# Patient Record
Sex: MALE | Race: WHITE | Employment: OTHER | URBAN - METROPOLITAN AREA
[De-identification: names, ages, dates, MRNs, and addresses within clinical notes are randomized per-mention and may not be internally consistent; named-entity substitution may affect disease eponyms.]

---

## 2017-04-17 ENCOUNTER — OFFICE VISIT (OUTPATIENT)
Dept: FAMILY MEDICINE CLINIC | Facility: CLINIC | Age: 62
End: 2017-04-17

## 2017-04-17 VITALS
SYSTOLIC BLOOD PRESSURE: 134 MMHG | TEMPERATURE: 98 F | WEIGHT: 214 LBS | BODY MASS INDEX: 28.99 KG/M2 | RESPIRATION RATE: 16 BRPM | HEIGHT: 72 IN | HEART RATE: 82 BPM | DIASTOLIC BLOOD PRESSURE: 78 MMHG

## 2017-04-17 DIAGNOSIS — H66.92 ACUTE LEFT OTITIS MEDIA: Primary | ICD-10-CM

## 2017-04-17 PROCEDURE — 99213 OFFICE O/P EST LOW 20 MIN: CPT | Performed by: NURSE PRACTITIONER

## 2017-04-17 RX ORDER — AMOXICILLIN 875 MG/1
875 TABLET, COATED ORAL 2 TIMES DAILY
Qty: 20 TABLET | Refills: 0 | Status: SHIPPED | OUTPATIENT
Start: 2017-04-17 | End: 2017-04-27

## 2017-04-17 NOTE — PROGRESS NOTES
CHIEF COMPLAINT:   Patient presents with:  Nasal Congestion: started saturday 4/15 w/ sinus pressure. Ear Pain: L ear started last night with mild hearing loss      HPI:   Shelbi Arenas is a 63year old male who presents for cold symptoms for  2  days. INJECTION, W/WO CONTRAST, DX/THERAPEUTIC SUBSTANCE, EPIDURAL/SUBARACHNOID; LUMBAR/SACRAL N/A 7/8/2014    Comment Procedure: LUMBAR EPIDURAL;  Surgeon: Anneliese Beckwith MD;  Location: [address] [place] [address] NDL/CATH SPI DX/THER Comment Procedure: INTRAOPERATIVE NEURO MONITORING;  Surgeon: Jeison Rivera MD;  Location: [facility] MAIN OR      Family History   Problem Relation Age of Onset   • Heart Disorder Father         Smoking Status: Current Some Day Smoker         Packs/Day: 0.00 Signed Prescriptions Disp Refills    amoxicillin 875 MG Oral Tab 20 tablet 0      Sig: Take 1 tablet (875 mg total) by mouth 2 (two) times daily. Risks, benefits, side effects of medication addressed and explained.     Patient Instructions     Mid © 6403-3031 [address], [address] [place]. All rights reserved. This information is not intended as a substitute for professional medical care. Always follow your healthcare professional's instructions.               [place]

## 2017-05-30 ENCOUNTER — HOSPITAL ENCOUNTER (EMERGENCY)
Age: 62
Discharge: HOME | End: 2017-05-30
Attending: EMERGENCY MEDICINE
Payer: COMMERCIAL

## 2017-05-30 ENCOUNTER — OFFICE VISIT (OUTPATIENT)
Dept: FAMILY MEDICINE CLINIC | Facility: CLINIC | Age: 62
End: 2017-05-30

## 2017-05-30 VITALS
BODY MASS INDEX: 28 KG/M2 | TEMPERATURE: 99 F | WEIGHT: 210 LBS | SYSTOLIC BLOOD PRESSURE: 184 MMHG | OXYGEN SATURATION: 96 % | RESPIRATION RATE: 16 BRPM | HEART RATE: 89 BPM | DIASTOLIC BLOOD PRESSURE: 89 MMHG

## 2017-05-30 VITALS
DIASTOLIC BLOOD PRESSURE: 86 MMHG | HEIGHT: 72 IN | SYSTOLIC BLOOD PRESSURE: 154 MMHG | BODY MASS INDEX: 29.39 KG/M2 | HEART RATE: 131 BPM | WEIGHT: 217 LBS | RESPIRATION RATE: 16 BRPM | OXYGEN SATURATION: 98 % | TEMPERATURE: 98 F

## 2017-05-30 DIAGNOSIS — Z02.9 ENCOUNTER FOR ADMINISTRATIVE EXAMINATIONS: ICD-10-CM

## 2017-05-30 DIAGNOSIS — K12.2 UVULITIS: Primary | ICD-10-CM

## 2017-05-30 DIAGNOSIS — H66.92 LEFT OTITIS MEDIA, UNSPECIFIED CHRONICITY, UNSPECIFIED OTITIS MEDIA TYPE: ICD-10-CM

## 2017-05-30 DIAGNOSIS — K12.2 UVULITIS: ICD-10-CM

## 2017-05-30 DIAGNOSIS — J02.9 SORE THROAT: Primary | ICD-10-CM

## 2017-05-30 PROCEDURE — 87430 STREP A AG IA: CPT | Performed by: EMERGENCY MEDICINE

## 2017-05-30 PROCEDURE — 87880 STREP A ASSAY W/OPTIC: CPT | Performed by: NURSE PRACTITIONER

## 2017-05-30 PROCEDURE — 99283 EMERGENCY DEPT VISIT LOW MDM: CPT

## 2017-05-30 PROCEDURE — 87081 CULTURE SCREEN ONLY: CPT | Performed by: EMERGENCY MEDICINE

## 2017-05-30 RX ORDER — PREDNISONE 20 MG/1
40 TABLET ORAL ONCE
Status: COMPLETED | OUTPATIENT
Start: 2017-05-30 | End: 2017-05-30

## 2017-05-30 RX ORDER — PREDNISONE 20 MG/1
40 TABLET ORAL DAILY
Qty: 8 TABLET | Refills: 0 | Status: SHIPPED | OUTPATIENT
Start: 2017-05-30 | End: 2017-06-03

## 2017-05-30 RX ORDER — AMOXICILLIN 875 MG/1
875 TABLET, COATED ORAL 2 TIMES DAILY
Qty: 20 TABLET | Refills: 0 | Status: SHIPPED | OUTPATIENT
Start: 2017-05-30 | End: 2017-06-09

## 2017-05-30 RX ORDER — DIPHENHYDRAMINE HCL 50 MG
50 CAPSULE ORAL ONCE
Status: COMPLETED | OUTPATIENT
Start: 2017-05-30 | End: 2017-05-30

## 2017-05-31 NOTE — ED PROVIDER NOTES
Patient Seen in: THE [facility] Emergency Department In [place]    History   Patient presents with:  Sore Throat    Stated Complaint: swollen uvula    HPI    82-year-old male complaining of swollen uvula the patient states this started this afternoon more so Akira Hagen MD;  Location: [address] [place] [address]    INJECTION, W/WO CONTRAST, DX/THERAPEUTIC SUBSTANCE, EPIDURAL/SUBARACHNOID; LUMBAR/SACRAL N/A 8/7/2014    Comment Procedure: LUMBAR EPIDURAL;  Surgeon: Vaishali Batres MD;  Location: [address] [place] [address] 2 cigars/day    Alcohol Use: Yes                Comment: 6-7 beers/weekend      Review of Systems    Positive for stated complaint: swollen uvula  Other systems are as noted in HPI. Constitutional and vital signs reviewed.       All other systems reviewed chronicity, unspecified otitis media type    Disposition:  Discharge    Follow-up:  KANDY Hutchins [address] [id number] [phone]    In 3 days        Medications Prescribed:  Current Discharge Medication List    STA

## 2017-05-31 NOTE — ED INITIAL ASSESSMENT (HPI)
C/o sore throat, seen at walk-in clinic. Sent to ER d/t swollen uvula.  States pain started suddenly today

## 2017-05-31 NOTE — PROGRESS NOTES
HPI:   Juan Garay is a 63year old male who presents with ill symptoms for  1  days. Patient reports swollen uvula, mild sore throat. Has tried Tylenol with not much relief.        Current Outpatient Prescriptions:  celecoxib (CELEBREX) 200 MG Oral Cap FOR PAIN MANAGEMENT    EM OW[facility]U & Malia Horta NDL/CATH SPI DX/THER NJX N/A 7/24/2014    Comment Procedure: LUMBAR EPIDURAL;  Surgeon: Priti Lazar MD;  Location: [address] [place] [address] BY [facility] [address] [facility] [address]  N [facility] 5+ YR N/A 7/24/2014    Comment 98%  GENERAL: well developed, well nourished,in no apparent distress, mildly muffled voice and non toxic appearing.   HEENT: atraumatic, normocephalic,ears with right TM cloudy and normal appearing, left TM clear, throat with positive swollen uvula, difficu

## 2017-05-31 NOTE — ED NOTES
Pt able to swallow w/o difficulty, understands no driving or alcohol while taking Benadryl d/t drowsiness.

## 2017-08-31 RX ORDER — SODIUM CHLORIDE, SODIUM LACTATE, POTASSIUM CHLORIDE, CALCIUM CHLORIDE 600; 310; 30; 20 MG/100ML; MG/100ML; MG/100ML; MG/100ML
INJECTION, SOLUTION INTRAVENOUS CONTINUOUS
Status: CANCELLED | OUTPATIENT
Start: 2017-08-31

## 2017-09-02 ENCOUNTER — APPOINTMENT (OUTPATIENT)
Dept: LAB | Age: 62
End: 2017-09-02
Payer: COMMERCIAL

## 2017-09-02 DIAGNOSIS — Z12.11 SCREENING FOR MALIGNANT NEOPLASM OF COLON: ICD-10-CM

## 2017-09-02 PROCEDURE — 93010 ELECTROCARDIOGRAM REPORT: CPT | Performed by: INTERNAL MEDICINE

## 2017-09-02 PROCEDURE — 93005 ELECTROCARDIOGRAM TRACING: CPT

## 2017-09-05 ENCOUNTER — HOSPITAL ENCOUNTER (OUTPATIENT)
Facility: HOSPITAL | Age: 62
Setting detail: HOSPITAL OUTPATIENT SURGERY
Discharge: HOME | End: 2017-09-05
Attending: INTERNAL MEDICINE | Admitting: INTERNAL MEDICINE
Payer: COMMERCIAL

## 2017-09-05 ENCOUNTER — ANESTHESIA EVENT (OUTPATIENT)
Dept: ENDOSCOPY | Facility: HOSPITAL | Age: 62
End: 2017-09-05
Payer: COMMERCIAL

## 2017-09-05 ENCOUNTER — SURGERY (OUTPATIENT)
Age: 62
End: 2017-09-05

## 2017-09-05 ENCOUNTER — ANESTHESIA (OUTPATIENT)
Dept: ENDOSCOPY | Facility: HOSPITAL | Age: 62
End: 2017-09-05
Payer: COMMERCIAL

## 2017-09-05 VITALS
OXYGEN SATURATION: 95 % | BODY MASS INDEX: 28.17 KG/M2 | TEMPERATURE: 98 F | SYSTOLIC BLOOD PRESSURE: 130 MMHG | HEIGHT: 72 IN | WEIGHT: 208 LBS | RESPIRATION RATE: 16 BRPM | HEART RATE: 86 BPM | DIASTOLIC BLOOD PRESSURE: 85 MMHG

## 2017-09-05 DIAGNOSIS — Z12.11 SCREENING FOR MALIGNANT NEOPLASM OF COLON: Primary | ICD-10-CM

## 2017-09-05 LAB
ATRIAL RATE: 76 BPM
P AXIS: -17 DEGREES
P-R INTERVAL: 132 MS
Q-T INTERVAL: 392 MS
QRS DURATION: 104 MS
QTC CALCULATION (BEZET): 441 MS
R AXIS: 64 DEGREES
T AXIS: 44 DEGREES
VENTRICULAR RATE: 76 BPM

## 2017-09-05 PROCEDURE — 0DBK8ZX EXCISION OF ASCENDING COLON, VIA NATURAL OR ARTIFICIAL OPENING ENDOSCOPIC, DIAGNOSTIC: ICD-10-PCS | Performed by: INTERNAL MEDICINE

## 2017-09-05 PROCEDURE — 88305 TISSUE EXAM BY PATHOLOGIST: CPT | Performed by: INTERNAL MEDICINE

## 2017-09-05 PROCEDURE — 0DBH8ZX EXCISION OF CECUM, VIA NATURAL OR ARTIFICIAL OPENING ENDOSCOPIC, DIAGNOSTIC: ICD-10-PCS | Performed by: INTERNAL MEDICINE

## 2017-09-05 RX ORDER — SODIUM CHLORIDE, SODIUM LACTATE, POTASSIUM CHLORIDE, CALCIUM CHLORIDE 600; 310; 30; 20 MG/100ML; MG/100ML; MG/100ML; MG/100ML
INJECTION, SOLUTION INTRAVENOUS CONTINUOUS
Status: DISCONTINUED | OUTPATIENT
Start: 2017-09-05 | End: 2017-09-05

## 2017-09-05 RX ORDER — AMLODIPINE BESYLATE 2.5 MG/1
2.5 TABLET ORAL DAILY
COMMUNITY
End: 2020-02-01 | Stop reason: DRUGHIGH

## 2017-09-05 NOTE — ANESTHESIA POSTPROCEDURE EVALUATION
[address] [facility] Patient Status:  Hospital Outpatient Surgery   Age/Gender 63year old male MRN [id number]   Location [address] [place] [address]. Attending Ирина Mariee MD   Hosp Day # 0 PCP Yolanda Monge MD       Anesthesia Post-
Wears glasses for reading/Partially impaired: cannot see medication labels or newsprint, but can see obstacles in path, and the surrounding layout; can count fingers at arm's length

## 2017-09-05 NOTE — ANESTHESIA PREPROCEDURE EVALUATION
PRE-OP EVALUATION    Patient Name: Deon Gottron    Pre-op Diagnosis: SCREENING FOR MALIGNANT NEOPLASM OF COLON    Procedure(s):  COLONOSCOPY    Surgeon(s) and Role:     Jessi Day MD - Primary    Pre-op vitals reviewed.   Temp: 98.3 °F (36.8 °C DX/THER NGL N/A      Comment: Procedure: LUMBAR EPIDURAL;  Surgeon:                John Jones MD;  Location:  [address] MANAGEMENT  8/7/2014: Deangelo OWUSU & Bella Samuels NDL/CATH SPI DX/THER IOM N/A      Comment: Procedure: LUMBAR EPIDU regular  Rate: normal     Dental    No notable dental history. Pulmonary    Pulmonary exam normal.  Breath sounds clear to auscultation bilaterally.                Other findings            ASA: 2   Plan: MAC  NPO status verified and patient meets g

## 2017-09-05 NOTE — H&P
[facility] [address] Patient Status:  Hospital Outpatient Surgery    1955 MRN [id number]   [facility] ENDOSCOPY Attending Peyton Rivera MD   Hosp Day # 0 PCP Adriane Valero MD     CC: Screening Micaela Chamorro MD;  Location: [place] [address] MANAGEMENT  7/24/2014: M[facility]SED[facility] BY  PHYS [address] [facility]  [address] [facility] 5+ YR N/A      Comment: Procedure: LUMBAR EPIDURAL;  Surgeon:                Micaela Chamorro MD;  Location: [address] [place] [address]  encounter      Screening    Plan  Colonoscopy    Dileep Ramirez  9/5/2017  12:50 PM

## 2017-09-05 NOTE — OPERATIVE REPORT
Juan Fernández Patient Status:  Hospital Outpatient Surgery    1955 MRN [id number]   [facility] ENDOSCOPY Attending Mildred Masters MD   Hosp Day # 0 PCP Michael Man MD     PREOPERATIVE DIAGNOSIS/INDICATION: Screening  POS watch for bleeding, infection, perforation, adverse drug reactions   - Follow biopsies.  - Repeat colonoscopy in 5 years.     Ramo Lam  9/5/2017  1:39 PM

## 2020-02-01 ENCOUNTER — OFFICE VISIT (OUTPATIENT)
Dept: PODIATRY CLINIC | Facility: CLINIC | Age: 65
End: 2020-02-01
Payer: COMMERCIAL

## 2020-02-01 DIAGNOSIS — L84 CALLUS OF FOOT: ICD-10-CM

## 2020-02-01 DIAGNOSIS — M79.671 FOOT PAIN, RIGHT: Primary | ICD-10-CM

## 2020-02-01 DIAGNOSIS — M21.371 ACQUIRED RIGHT FOOT DROP: ICD-10-CM

## 2020-02-01 PROCEDURE — 99203 OFFICE O/P NEW LOW 30 MIN: CPT | Performed by: PODIATRIST

## 2020-02-01 RX ORDER — AMLODIPINE BESYLATE 10 MG/1
TABLET ORAL
COMMUNITY
Start: 2020-01-18

## 2020-02-03 NOTE — PROGRESS NOTES
Saulo Blankenship is a 60year old male. Patient presents with:  Consult: right foot, callus outside edge of foot.  \"by the end of the day, it's killing me\" pt does wear brace, and would like to discuss a  prescription for a new brace        HPI:   Patient 8/7/2014    Performed by Vaishali Batres MD at [address] [place] [address] N/A 7/24/2014    Performed by Vaishali Batres MD at [address] [place] [address] N/A 7/8/2014    Performed by Vaishali Batres MD at [facility] out of 5 and eversion is about 2+ out of 5. This is on the right lower extremity.     ASSESSMENT AND PLAN:   Diagnoses and all orders for this visit:    Foot pain, right  -     DME - EXTERNAL     Acquired right foot drop  -     DME - EXTERNAL     Callus of

## 2021-11-16 ENCOUNTER — APPOINTMENT (OUTPATIENT)
Dept: GENERAL RADIOLOGY | Age: 66
End: 2021-11-16
Payer: COMMERCIAL

## 2021-11-16 ENCOUNTER — HOSPITAL ENCOUNTER (EMERGENCY)
Age: 66
Discharge: HOME | End: 2021-11-16
Payer: COMMERCIAL

## 2021-11-16 VITALS
RESPIRATION RATE: 22 BRPM | WEIGHT: 230 LBS | DIASTOLIC BLOOD PRESSURE: 80 MMHG | HEIGHT: 72 IN | TEMPERATURE: 98 F | HEART RATE: 106 BPM | SYSTOLIC BLOOD PRESSURE: 144 MMHG | BODY MASS INDEX: 31.15 KG/M2 | OXYGEN SATURATION: 96 %

## 2021-11-16 DIAGNOSIS — S60.222A CONTUSION OF LEFT HAND, INITIAL ENCOUNTER: ICD-10-CM

## 2021-11-16 DIAGNOSIS — S61.215A LACERATION OF LEFT RING FINGER WITHOUT FOREIGN BODY WITHOUT DAMAGE TO NAIL, INITIAL ENCOUNTER: ICD-10-CM

## 2021-11-16 DIAGNOSIS — S50.12XA CONTUSION OF LEFT FOREARM, INITIAL ENCOUNTER: Primary | ICD-10-CM

## 2021-11-16 PROCEDURE — 12001 RPR S/N/AX/GEN/TRNK 2.5CM/<: CPT

## 2021-11-16 PROCEDURE — 99284 EMERGENCY DEPT VISIT MOD MDM: CPT

## 2021-11-16 PROCEDURE — 73130 X-RAY EXAM OF HAND: CPT

## 2021-11-16 PROCEDURE — 73090 X-RAY EXAM OF FOREARM: CPT

## 2021-11-16 RX ORDER — HYDROCODONE BITARTRATE AND ACETAMINOPHEN 5; 325 MG/1; MG/1
1-2 TABLET ORAL EVERY 6 HOURS PRN
Qty: 10 TABLET | Refills: 0 | Status: SHIPPED | OUTPATIENT
Start: 2021-11-16 | End: 2021-11-23

## 2021-11-16 RX ORDER — HYDROCODONE BITARTRATE AND ACETAMINOPHEN 5; 325 MG/1; MG/1
1 TABLET ORAL ONCE
Status: COMPLETED | OUTPATIENT
Start: 2021-11-16 | End: 2021-11-16

## 2021-11-17 NOTE — ED PROVIDER NOTES
Patient Seen in: THE [facility] Emergency Department In [place]      History   Patient presents with:  Laceration/Abrasion    Stated Complaint: Laceration to right hand, patient was putting up [place] decorations.      Subjective:   HPI    Patient is a 66-ye CONTRAST, DX/THERAPEUTIC SUBSTANCE, EPIDURAL/SUBARACHNOID; LUMBAR/SACRAL N/A 7/24/2014    Procedure: LUMBAR EPIDURAL;  Surgeon: Seun Burnham MD;  Location: [facility] FOR PAIN MANAGEMENT   • INJECTION, W/WO CONTRAST, DX/THERAPEUTIC SUBSTANCE, EPIDURAL/S round reactive to light and accommodation. Mouth normal, neck supple, no meningismus. Neck nontender, head atraumatic  LUNGS: Lungs clear to auscultation bilaterally. CARDIOVASCULAR: + S1-S2, regular rate and rhythm, no murmurs.   BACK: No CVA tenderness encounter     Disposition:  Discharge  11/16/2021  9:50 pm    Follow-up:  KANDY Pressley Rene [address] [id number] [phone]    In 2 days            Medications Prescribed:  Current Discharge Medication List    START taking t

## 2021-11-17 NOTE — ED INITIAL ASSESSMENT (HPI)
Pt to ed from a fall off his ladder,  (8ft up) pain and injury hand and forearm bump, denies hitting head or loc, pt on blood thinner

## 2024-06-24 ENCOUNTER — HOSPITAL ENCOUNTER (OUTPATIENT)
Dept: CV DIAGNOSTICS | Facility: HOSPITAL | Age: 69
Discharge: HOME | End: 2024-06-24
Attending: FAMILY MEDICINE

## 2024-06-24 DIAGNOSIS — E78.5 HYPERLIPIDEMIA, UNSPECIFIED HYPERLIPIDEMIA TYPE: ICD-10-CM

## 2024-06-24 DIAGNOSIS — I10 HYPERTENSION, ESSENTIAL: ICD-10-CM

## 2024-06-24 PROCEDURE — 93306 TTE W/DOPPLER COMPLETE: CPT | Performed by: FAMILY MEDICINE

## 2024-09-06 PROBLEM — Z86.0101 HISTORY OF ADENOMATOUS POLYP OF COLON: Status: ACTIVE | Noted: 2024-09-06

## 2025-01-06 ENCOUNTER — TELEPHONE (OUTPATIENT)
Facility: CLINIC | Age: 70
End: 2025-01-06

## 2025-01-06 DIAGNOSIS — M79.671 RIGHT FOOT PAIN: Primary | ICD-10-CM

## 2025-01-06 NOTE — TELEPHONE ENCOUNTER
Left message with patient to ask how long ago the right foot and ankle Xrays were done and if they were weight bearing.

## 2025-01-06 NOTE — TELEPHONE ENCOUNTER
New pt appt for rt foot and ankle imaging on disc and pt will bring  Future Appointments  1/31/2025  9:30 AM    Eliza Mortensen, ANTHONY   [facility]        [facility]

## 2025-01-07 NOTE — TELEPHONE ENCOUNTER
Patient calling back to state the xrays done were laying down and that it was the beginning of December.   Advised patient to arrive 15 min early for a weight bearing xray most likely.

## 2025-07-11 ENCOUNTER — CLINICAL ABSTRACT (OUTPATIENT)
Age: 70
End: 2025-07-11

## 2025-07-11 VITALS — SYSTOLIC BLOOD PRESSURE: 130 MMHG | DIASTOLIC BLOOD PRESSURE: 68 MMHG

## (undated) DEVICE — 1200CC GUARDIAN II: Brand: GUARDIAN

## (undated) DEVICE — SNARE CAPTIFLEX MICRO-OVL OLY

## (undated) DEVICE — Device: Brand: DEFENDO AIR/WATER/SUCTION AND BIOPSY VALVE

## (undated) DEVICE — TRAP 4 CPTR CHMBR N EZ INLN

## (undated) DEVICE — FILTERLINE NASAL ADULT O2/CO2

## (undated) DEVICE — FORCEP BIOPSY RJ4 LG CAP W/ND

## (undated) DEVICE — 3M™ RED DOT™ MONITORING ELECTRODE WITH FOAM TAPE AND STICKY GEL, 50/BAG, 20/CASE, 72/PLT 2570: Brand: RED DOT™

## (undated) DEVICE — ENDOSCOPY PACK - LOWER: Brand: MEDLINE INDUSTRIES, INC.

## (undated) NOTE — MR AVS SNAPSHOT
[facility]  [address] [facility]  [place] [address] [phone]               Thank you for choosing us for your health care visit with SUNDAR Santiago. We are glad to serve you and happy to provide you with this summary of your visit.   Ple 27years of age who has a fever. It may cause severe illness or death. Follow-up care  Follow up with your healthcare provider, or as advised, in 2 weeks if all symptoms have not gotten better, or if hearing doesn't go back to normal within 1 month.   When Take 1 tablet (600 mg total) by mouth 3 (three) times daily. Commonly known as:  MOTRIN           MULTI-VITAMIN/MINERALS Tabs   Take 1 tablet by mouth daily.            PLAVIX 75 MG Tabs   Generic drug:  Clopidogrel Bisulfate   1 TABLET DAILY           vi Tips for increasing your physical activity – Adults who are physically active are less likely to develop some chronic diseases than adults who are inactive.      HOW TO GET STARTED: HOW TO STAY MOTIVATED:   Start activities slowly and build up over time Do

## (undated) NOTE — ED AVS SNAPSHOT
[facility] in [address] [place] [address]    Phone:  [phone]    Fax:  [address] [place] [address]   MRN: [id number]    Department:  [facility] in [place]   Date of Visit: IF THERE IS ANY CHANGE OR WORSENING OF YOUR CONDITION, CALL YOUR PRIMARY CARE PHYSICIAN AT ONCE OR RETURN IMMEDIATELY TO THE EMERGENCY DEPARTMENT.     If you have been prescribed any medication(s), please fill your prescription right away and begin taking t

## (undated) NOTE — MR AVS SNAPSHOT
[facility] [address] [facility]  [place] [address]  [phone]               Thank you for choosing us for your health care visit with SUNDAR Fajardo. We are glad to serve you and happy to provide you with this summary of your visit.   Please h Take 1,000 Units by mouth daily. * Notice: This list has 2 medication(s) that are the same as other medications prescribed for you. Read the directions carefully, and ask your doctor or other care provider to review them with you.             Res

## (undated) NOTE — Clinical Note
Date: 4/17/2017    Patient Name: Brandon Freeman          To Whom it may concern: The above patient was seen at the [facility] for treatment of a medical condition.     This patient should be excused from attending work due to medical illne

## (undated) NOTE — ED AVS SNAPSHOT
THE [facility] Emergency Department in [address] [facility]    Phone:  [phone]    Fax:  [facility]   MRN: [id number]    Department:  THE [facility] Emergency Department in [place]   Date of Visit: Stop the enalapril. Contact your primary care physician for guidance or prescription for different class of medications because of the possibility of angioedema of the uvula. Take the amoxicillin as prescribed.   Use Benadryl 25-50 mg 4 times a day as n return to your personal doctor) about any new or lasting problems. The primary care or specialist physician will see patients referred from the [facility] Emergency Department. Follow-up care is at the discretion of that Physician.     IF THERE IS ANY - If you have concerns related to behavioral health issues or thoughts of harming yourself, contact [address] [place] [address] at [phone].     - If you don’t have insurance, [facility] has partnered with [employer]